# Patient Record
Sex: MALE | HISPANIC OR LATINO | Employment: FULL TIME | URBAN - METROPOLITAN AREA
[De-identification: names, ages, dates, MRNs, and addresses within clinical notes are randomized per-mention and may not be internally consistent; named-entity substitution may affect disease eponyms.]

---

## 2017-12-21 ENCOUNTER — APPOINTMENT (OUTPATIENT)
Dept: RADIOLOGY | Facility: IMAGING CENTER | Age: 37
End: 2017-12-21
Attending: PHYSICIAN ASSISTANT
Payer: COMMERCIAL

## 2017-12-21 ENCOUNTER — OCCUPATIONAL MEDICINE (OUTPATIENT)
Dept: URGENT CARE | Facility: CLINIC | Age: 37
End: 2017-12-21
Payer: COMMERCIAL

## 2017-12-21 VITALS
OXYGEN SATURATION: 99 % | HEIGHT: 71 IN | BODY MASS INDEX: 23.66 KG/M2 | HEART RATE: 69 BPM | SYSTOLIC BLOOD PRESSURE: 120 MMHG | TEMPERATURE: 97.9 F | DIASTOLIC BLOOD PRESSURE: 90 MMHG | WEIGHT: 169 LBS | RESPIRATION RATE: 16 BRPM

## 2017-12-21 DIAGNOSIS — M79.652 LEFT THIGH PAIN: ICD-10-CM

## 2017-12-21 DIAGNOSIS — M54.9 UPPER BACK PAIN ON RIGHT SIDE: ICD-10-CM

## 2017-12-21 DIAGNOSIS — Z02.1 PRE-EMPLOYMENT DRUG SCREENING: ICD-10-CM

## 2017-12-21 DIAGNOSIS — M94.0 COSTOCHONDRITIS, ACUTE: Primary | ICD-10-CM

## 2017-12-21 DIAGNOSIS — R07.81 RIB PAIN ON LEFT SIDE: ICD-10-CM

## 2017-12-21 DIAGNOSIS — S70.12XA CONTUSION OF LEFT ANTERIOR THIGH, INITIAL ENCOUNTER: ICD-10-CM

## 2017-12-21 LAB
AMP AMPHETAMINE: NORMAL
BREATH ALCOHOL COMMENT: NORMAL
COC COCAINE: NORMAL
INT CON NEG: NORMAL
INT CON POS: NORMAL
MET METHAMPHETAMINES: NORMAL
OPI OPIATES: NORMAL
PCP PHENCYCLIDINE: NORMAL
POC BREATHALIZER: 0 PERCENT (ref 0–0.01)
POC DRUG COMMENT 753798-OCCUPATIONAL HEALTH: NEGATIVE
THC: NORMAL

## 2017-12-21 PROCEDURE — 99203 OFFICE O/P NEW LOW 30 MIN: CPT | Mod: 29 | Performed by: PHYSICIAN ASSISTANT

## 2017-12-21 PROCEDURE — 71101 X-RAY EXAM UNILAT RIBS/CHEST: CPT | Mod: TC,LT,29 | Performed by: PHYSICIAN ASSISTANT

## 2017-12-21 PROCEDURE — 82075 ASSAY OF BREATH ETHANOL: CPT | Mod: 29 | Performed by: PHYSICIAN ASSISTANT

## 2017-12-21 PROCEDURE — 73552 X-RAY EXAM OF FEMUR 2/>: CPT | Mod: TC,LT,29 | Performed by: PHYSICIAN ASSISTANT

## 2017-12-21 PROCEDURE — 80305 DRUG TEST PRSMV DIR OPT OBS: CPT | Mod: 29 | Performed by: PHYSICIAN ASSISTANT

## 2017-12-21 NOTE — PROGRESS NOTES
"Subjective:      PT is a 37 y.o. male who presents with Back Pain (today at work, was bringing down the wall and missed a step and wall came down on his chest and upper leg, fell on his back, now having pain middle back and left chest and ribs, left leg)      DOI: 12/21/17  10:10am  PT states that today at work, he was bringing down a 10\"x10\" wall and missed a step and wall came down on his left chest and left upper leg, and he fell on his back, now having pain in the right upper middle back. Pt has not taken any Rx medications for this condition. Pt states the pain is a 7/10 in left chest region, aching in nature and worse \"right now\". Pt denies CP, SOB, NVD, paresthesias, headaches, dizziness, change in vision, hives, or other joint pain. The pt's medication list, problem list, and allergies have been evaluated and reviewed during today's visit.   Pt denies a second job.    HPI  PMH:  Negative per pt.      PSH:  Negative per pt.      Fam Hx:  the patient's family history is not pertinent to their current complaint      Soc HX:  Social History     Social History   • Marital status:      Spouse name: N/A   • Number of children: N/A   • Years of education: N/A     Occupational History   • Not on file.     Social History Main Topics   • Smoking status: Not on file   • Smokeless tobacco: Not on file   • Alcohol use Not on file   • Drug use: Unknown   • Sexual activity: Not on file     Other Topics Concern   • Not on file     Social History Narrative   • No narrative on file         Medications:  No current outpatient prescriptions on file.      Allergies:  Patient has no allergy information on record.    ROS  Constitutional: Negative for fever, chills and malaise/fatigue.   HENT: Negative for congestion and sore throat.    Eyes: Negative for blurred vision, double vision and photophobia.   Respiratory: Negative for cough and shortness of breath.    Cardiovascular: Negative for chest pain and palpitations. " "  Gastrointestinal: Negative for heartburn, nausea, vomiting, abdominal pain, diarrhea and constipation.   Genitourinary: Negative for dysuria and flank pain.   Musculoskeletal: POS for left thigh, left ribs and right upper back joint pain and myalgias.   Skin: Negative for itching and rash.   Neurological: Negative for dizziness, tingling and headaches.   Endo/Heme/Allergies: Does not bruise/bleed easily.   Psychiatric/Behavioral: Negative for depression. The patient is not nervous/anxious.           Objective:     /90   Pulse 69   Temp 36.6 °C (97.9 °F)   Resp 16   Ht 1.803 m (5' 11\")   Wt 76.7 kg (169 lb)   SpO2 99%   BMI 23.57 kg/m²      Physical Exam    Left chest wall: Upon inspection, + ecchymoses, +edema, no erythema. +TTP left anterolateral ribs  Left upper thigh: Upon inspection, no ecchymoses, mild edema, no erythema. +TTP, +AROM, +SILT, STR 5/5, DP 2+ B/L   Right upper back: Upon inspection, no ecchymoses, no edema, no erythema. +TTP, +AROM        Constitutional: PT is oriented to person, place, and time. PT appears well-developed and well-nourished. No distress.   HENT:   Head: Normocephalic and atraumatic.   Mouth/Throat: Oropharynx is clear and moist. No oropharyngeal exudate.   Eyes: Conjunctivae normal and EOM are normal. Pupils are equal, round, and reactive to light.   Neck: Normal range of motion. Neck supple. No thyromegaly present.   Cardiovascular: Normal rate, regular rhythm, normal heart sounds and intact distal pulses.  Exam reveals no gallop and no friction rub.    No murmur heard.  Pulmonary/Chest: Effort normal and breath sounds normal. No respiratory distress. PT has no wheezes. PT has no rales. Pt exhibits no tenderness.   Abdominal: Soft. Bowel sounds are normal. PT exhibits no distension and no mass. There is no tenderness. There is no rebound and no guarding.   Neurological: PT is alert and oriented to person, place, and time. PT has normal reflexes. No cranial nerve " deficit.   Skin: Skin is warm and dry. No rash noted. PT is not diaphoretic. No erythema.       Psychiatric: PT has a normal mood and affect. PT behavior is normal. Judgment and thought content normal.     RADS:  Narrative     12/21/2017 12:05 PM    HISTORY/REASON FOR EXAM:  Pain/Deformity Following Trauma.  .    TECHNIQUE/EXAM DESCRIPTION AND NUMBER OF VIEWS:  2 views of the LEFT femur.    COMPARISON: None    FINDINGS:  Femoral head is seated within the acetabulum. Joint space is maintained. No fracture or dislocation is seen. There is linear calcification in the soft tissues of the medial left thigh. There is a trace suprapatellar joint effusion.   Impression     No fracture or dislocation is seen.    Linear calcification in the soft tissues of the medial thigh is likely dystrophic/heterotopic.    Trace suprapatellar joint effusion.      Reading Provider Reading Date   Courtney Burris M.D. Dec 21, 2017      Signing Provider Signing Date Signing Time   Courtney Burris M.D. Dec 21, 2017  1:04 PM   Narrative     12/21/2017 12:05 PM    HISTORY/REASON FOR EXAM:  Pain Following Trauma.  Left Mid rib/ chest pain after part of a wall fell on chest    TECHNIQUE/EXAM DESCRIPTION AND NUMBER OF VIEWS:  5 images of the left ribs and chest.    COMPARISON: NONE    FINDINGS:  No displaced fractures or acute bony changes are noted.  No airspace opacity or consolidation.  There is no evidence of a hemo or pneumothorax.  Normal cardiopericardial silhouette.   Impression     No displaced rib fracture.      Reading Provider Reading Date   Yefri Cespedes M.D. Dec 21, 2017      Signing Provider Signing Date Signing Time   Yefri Cespedes M.D. Dec 21, 2017  1:06 PM            Assessment/Plan:     1. Costochondritis    2. Rib pain on left side    - [id number] (WITH 1-VIEW CXR) LEFT; Future    2. Left thigh contusion    - DX-FEMUR-2+ LEFT; Future    3. Upper back pain on right side    - [id number] (WITH 1-VIEW CXR)  LEFT; Future    RICE therapy discussed  Gentle ROM exercises discussed  WBAT left LE  Ice/heat therapy discussed  OTC ibuprofen for pain control  Rest, fluids encouraged.  AVS with medical info given.  Pt was in full understanding and agreement with the plan.  Follow-up 3 days for next WC visit

## 2017-12-21 NOTE — LETTER
"EMPLOYEE’S CLAIM FOR COMPENSATION/ REPORT OF INITIAL TREATMENT  FORM C-4    EMPLOYEE’S CLAIM - PROVIDE ALL INFORMATION REQUESTED   First Name  Bladimir Last Name  Cheri Birthdate                    1980                Sex  male Claim Number   Home Address  [address] Age  37 y.o. Height  1.803 m (5' 11\") Weight  76.7 kg (169 lb) N     [facility] Zip  [address] Telephone  [phone] (home)    Mailing Address  [address] [facility] Zip  [address] Primary Language Spoken  Sami    Insurer   Third Party   [employer]  [place]   Employee's Occupation (Job Title) When Injury or Occupational Disease Occurred      Employer's Name  [employer]  Telephone  [phone]    Employer Address  [address]  [facility]  Zip  [address]   Date of Injury  12/21/2017               Hour of Injury  10:10 AM Date Employer Notified  12/21/2017 Last Day of Work after Injury or Occupational Disease  12/21/2017 Supervisor to Whom Injury Reported  Himanshu Orosco   Address or Location of Accident (if applicable)  [[address]]   What were you doing at the time of accident? (if applicable)  Putting a wall down to 9'    How did this injury or occupational disease occur? (Be specific an answer in detail. Use additional sheet if necessary)  Laying the 10'x10' wall down to cut down tow 0' and slipped down because I stepped on a rock.   If you believe that you have an occupational disease, when did you first have knowledge of the disability and it relationship to your employment?   Witnesses to the Accident  John      Nature of Injury or Occupational Disease  Sprain  Part(s) of Body Injured or Affected  Upper Leg (L), ,     I certify that the above is true and correct to the best of my knowledge and that I have provided this information in order to obtain the benefits of [place]’s " [place]).  I hereby authorize any physician, chiropractor, surgeon, practitioner, or other person, any hospital, including [facility] or [facility], any medical service organization, any insurance company, or other institution or organization to release to each other, any medical or other information, including benefits paid or payable, pertinent to this injury or disease, except information relative to diagnosis, treatment and/or counseling for AIDS, psychological conditions, alcohol or controlled substances, for which I must give specific authorization.  A Photostat of this authorization shall be as valid as the original.     Date   Place   Employee’s Signature   THIS REPORT MUST BE COMPLETED AND MAILED WITHIN 3 WORKING DAYS OF TREATMENT   Place  [facility]  Name of Facility  [facility]   Date  12/21/2017 Diagnosis  (M94.0) Costochondritis, acute  (primary encounter diagnosis)  (R07.81) Rib pain on left side  (S70.12XA) Contusion of left anterior thigh, initial encounter  (M54.9) Upper back pain on right side Is there evidence the injured employee was under the influence of alcohol and/or another controlled substance at the time of accident?   Hour  11:31 AM Description of Injury or Disease  The primary encounter diagnosis was Costochondritis, acute. Diagnoses of Rib pain on left side, Contusion of left anterior thigh, initial encounter, and Upper back pain on right side were also pertinent to this visit. No   Treatment  Rest, ice therapy, OTC ibuprofen, Gentle ROM exercises  Have you advised the patient to remain off work five days or more? No   X-Ray Findings  Negative   If Yes   From Date  To Date      From information given by the employee, together with medical evidence, can you directly connect this injury or occupational disease as job incurred?  Yes If No Full Duty  No Modified  "Duty  Yes   Is additional medical care by a physician indicated?  Yes If Modified Duty, Specify any Limitations / Restrictions  SEE RESTRICTIONS   Do you know of any previous injury or disease contributing to this condition or occupational disease?                            No   Date  12/21/2017 Print Doctor’s Name Yon Carreon P.A.-C. I certify the employer’s copy of  this form was mailed on:   Address  [address] Insurer’s Use Only     [facility] Zip  [address]    Provider’s Tax ID Number  [id number] Telephone  Dept: [phone]        e-YON Elizabeth P.A.-C.   e-Signature: Dr. Loyd Sales, Medical Director Degree  MELLISA        ORIGINAL-TREATING PHYSICIAN OR CHIROPRACTOR    PAGE 2-INSURER/TPA    PAGE 3-EMPLOYER    PAGE 4-EMPLOYEE             Form C-4 (rev10/07)              BRIEF DESCRIPTION OF RIGHTS AND BENEFITS  (Pursuant to [place]RS 616C.050)    Notice of Injury or Occupational Disease (Incident Report Form C-1): If an injury or occupational disease (OD) arises out of and in the  course of employment, you must provide written notice to your employer as soon as practicable, but no later than 7 days after the accident or  OD. Your employer shall maintain a sufficient supply of the required forms.    Claim for Compensation (Form C-4): If medical treatment is sought, the form C-4 is available at the place of initial treatment. A completed  \"Claim for Compensation\" (Form C-4) must be filed within 90 days after an accident or OD. The treating physician or chiropractor must,  within 3 working days after treatment, complete and mail to the employer, the employer's insurer and third-party , the Claim for  Compensation.    Medical Treatment: If you require medical treatment for your on-the-job injury or OD, you may be required to select a physician or  chiropractor from a list provided by your workers’ compensation insurer, if it has contracted with an " Organization for Managed Care (MCO) or  Preferred Provider Organization (PPO) or providers of health care. If your employer has not entered into a contract with an MCO or PPO, you  may select a physician or chiropractor from the Panel of Physicians and Chiropractors. Any medical costs related to your industrial injury or  OD will be paid by your insurer.    Temporary Total Disability (TTD): If your doctor has certified that you are unable to work for a period of at least 5 consecutive days, or 5  cumulative days in a 20-day period, or places restrictions on you that your employer does not accommodate, you may be entitled to TTD  compensation.    Temporary Partial Disability (TPD): If the wage you receive upon reemployment is less than the compensation for TTD to which you are  entitled, the insurer may be required to pay you TPD compensation to make up the difference. TPD can only be paid for a maximum of 24  months.    Permanent Partial Disability (PPD): When your medical condition is stable and there is an indication of a PPD as a result of your injury or  OD, within 30 days, your insurer must arrange for an evaluation by a rating physician or chiropractor to determine the degree of your PPD. The  amount of your PPD award depends on the date of injury, the results of the PPD evaluation and your age and wage.    Permanent Total Disability (PTD): If you are medically certified by a treating physician or chiropractor as permanently and totally disabled  and have been granted a PTD status by your insurer, you are entitled to receive monthly benefits not to exceed 66 2/3% of your average  monthly wage. The amount of your PTD payments is subject to reduction if you previously received a PPD award.    Vocational Rehabilitation Services: You may be eligible for vocational rehabilitation services if you are unable to return to the job due to a  permanent physical impairment or permanent restrictions as a result of your  injury or occupational disease.    Transportation and Per Rosie Reimbursement: You may be eligible for travel expenses and per rosie associated with medical treatment.    Reopening: You may be able to reopen your claim if your condition worsens after claim closure.    Appeal Process: If you disagree with a written determination issued by the insurer or the insurer does not respond to your request, you may  appeal to the Department of Administration, , by following the instructions contained in your determination letter. You must  appeal the determination within 70 days from the date of the determination letter at [address], [address], [place], [place]  [address], or [address], [address], [place], [place] [address]. If you disagree with the  decision, you may appeal to the  Department of Administration, . You must file your appeal within 30 days from the date of the  decision  letter at [address], [address], [place], [place] [address], or [address] [facility] [address], [place] [address]. If you  disagree with a decision of an , you may file a petition for judicial review with the District Court. You must do so within 30  days of the Appeal Officer’s decision. You may be represented by an  at your own expense or you may contact the [facility] for possible  representation.    [place]  for Injured Workers (NAIW): If you disagree with a  decision, you may request that NAIW represent you  without charge at an  Hearing. For information regarding denial of benefits, you may contact the [facility] at: [address], [address], [place] [address], [phone], or [address] [facility] [address], [place] [address], [phone]    To File a Complaint with the Division: If you wish to file a complaint with the  of the Division of Industrial Relations  (DIR),  please contact the Workers’ Compensation Section, [address], [address], [place], [place] [address], telephone [phone], or  [address], [address], [place], [place] [address], telephone [phone].    For assistance with Workers’ Compensation Issues: you may contact the Office of the [facility] Consumer Health Assistance, [address] [place] [address], [address], [place], [place] [address], Toll Free 1-458.398.7802, Web site: http://govcha.[facility].nv., E-mail  [email or web address].[email or web address].                                                                                                                                                                                                                                   __________________________________________________________________                                                                   _________________                Employee Name / Signature                                                                                                                                                       Date                                                                                                                                                                                                     D-2 (rev. 10/07)

## 2017-12-21 NOTE — PATIENT INSTRUCTIONS
Costocondritis  (Costochondritis)  La costocondritis a veces llamada síndrome de [place] es la hinchazón e irritación (inflamación) del tejido (cartílago) que une las costillas con el (esternón). [place] causa dolor en el pecho y la nathan de las costillas. La costocondritis generalmente desaparece por sí misma con el tiempo. Podrá tardar hasta 6 semanas en curarse o más, especialmente si no puede restringir tarun actividades.  CAUSAS   Algunos casos de costocondritis no tienen causa conocida. Las causas posibles son:  · Lesiones (traumatismos).  · Ejercicios o actividades relacionadas con levantar pesos.  · Tos intensa.  SIGNOS Y SÍNTOMAS  · Dolor y sensibilidad en el área de las costillas.  · Dolor que empeora al toser o respirar profundamente.  · Dolor que empeora con movimientos específicos.  DIAGNÓSTICO   El médico le preguntará acerca de tarun síntomas y le hará un examen físico. Podrá indicarle radiografías para descartar otros problemas.  TRATAMIENTO   La costocondritis generalmente desaparece por sí misma con el tiempo. El médico podrá recetar algunos medicamentos para calmar el dolor.  INSTRUCCIONES PARA EL CUIDADO EN EL HOGAR   · Evite la actividad física extenuante. Trate de no esforzar las costillas zohra las actividades habituales. Aquí se incluyen las actividades en las que usa los músculos del pecho, abdominales y los músculos laterales, especialmente si debe levantar peso.  · Aplique hielo en la nathan afectada zohra los primeros 2 aileen después del inicio del dolor.  ¨ Ponga el hielo en ti bolsa plástica.  ¨ Colóqu[place] ti toalla entre la piel y la bolsa de hielo.  ¨ Deje el hielo zohra 20 minutos, y aplíquelo 2-3 veces por día.  · [place] sólo medicamentos de venta oseas o recetados, según las indicaciones del médico.  SOLICITE ATENCIÓN MÉDICA SI:  · Tiene hinchazón o irritación en las articulaciones de las costillas. Estos son signos de infección.  · El dolor no desaparece aunque rodolfo reposo o tome  medicamentos para el dolor.  SOLICITE ATENCIÓN MÉDICA DE INMEDIATO SI:   · El dolor aumenta o siente muchas molestias.  · Le falta el aire o tiene dificultad para respirar.  · Tose y escupe brayan.  · Siente falta de aire o dolor en el pecho, sudoración o vómitos.  · Tiene fiebre o síntomas persistentes zohra más de 2 - 3 aileen.  · Tiene fiebre y los síntomas empeoran repentinamente.  ASEGÚRESE DE QUE:   · Comprende estas instrucciones.  · Controlará turner afección.  · Recibirá ayuda de inmediato si no mejora o si empeora.     Esta información no tiene tamiko fin reemplazar el consejo del médico. Asegúrese de hacerle al médico cualquier pregunta que tenga.     Document Released: 09/27/2006 Document Revised: 10/08/2014  Elsevier Interactive Patient Education ©2016 Elsevier Inc.

## 2017-12-21 NOTE — LETTER
"   [facility]  [address] - [place] [address]  Phone:  [phone] - Fax:  [phone]   [facility] Progress Report and Disability Certification  Date of Service: 12/21/2017   No Show:  No  Date / Time of Next Visit: 12/24/2017   Claim Information   Patient Name: Bladimir Alonzo  Claim Number:     Employer: [employer]  Date of Injury: 12/21/2017     Insurer / TPA: [employer] [place]  ID / SSN:     Occupation:   Diagnosis: The primary encounter diagnosis was Costochondritis, acute. Diagnoses of Rib pain on left side, Contusion of left anterior thigh, initial encounter, and Upper back pain on right side were also pertinent to this visit.    Medical Information   Related to Industrial Injury? Yes    Subjective Complaints:  DOI: 12/21/17  10:10am  PT states that today at work, he was bringing down a 10\"x10\" wall and missed a step and wall came down on his left chest and left upper leg, and he fell on his back, now having pain in the right upper middle back. Pt has not taken any Rx medications for this condition. Pt states the pain is a 7/10 in left chest region, aching in nature and worse \"right now\". Pt denies CP, SOB, NVD, paresthesias, headaches, dizziness, change in vision, hives, or other joint pain. The pt's medication list, problem list, and allergies have been evaluated and reviewed during today's visit.     Objective Findings: Left chest wall: Upon inspection, + ecchymoses, +edema, no erythema. +TTP left anterolateral ribs  Left upper thigh: Upon inspection, no ecchymoses, mild edema, no erythema. +TTP, +AROM, +SILT, STR 5/5, DP 2+ B/L   Right upper back: Upon inspection, no ecchymoses, no edema, no erythema. +TTP, +AROM          Pre-Existing Condition(s):     Assessment:   Initial Visit    Status: Additional Care Required  Permanent Disability:No    Plan: Medication  Comments:OTC ibuprofen for pain    Diagnostics: " X-ray  Comments:NEG    Comments:       Disability Information   Status: Released to Restricted Duty    From:  2017  Through: 2017 Restrictions are: Temporary   Physical Restrictions   Sitting:    Standing:  < or = to 2 hrs/day Stooping:    Bending:      Squattin hrs/day Walking:  < or = to 2 hrs/day Climbin hrs/day Pushing:      Pulling:    Other:    Reaching Above Shoulder (L):   Reaching Above Shoulder (R):       Reaching Below Shoulder (L):    Reaching Below Shoulder (R):      Not to exceed Weight Limits   Carrying(hrs):   Weight Limit(lb): < or = to 10 pounds Lifting(hrs):   Weight  Limit(lb): < or = to 10 pounds   Comments:      Repetitive Actions   Hands: i.e. Fine Manipulations from Grasping:     Feet: i.e. Operating Foot Controls:     Driving / Operate Machinery:     Physician Name: Yon Carreon P.A.-C. Physician Signature: YON Beauchamp P.A.-C. e-Signature: Dr. Loyd Sales, Medical Director   Clinic Name / Location: [address] [place] [address] Clinic Phone Number: Dept: [phone]   Appointment Time: 11:00 Am Visit Start Time: 11:31 AM   Check-In Time:  11:10 Am Visit Discharge Time:    Original-Treating Physician or Chiropractor    Page 2-Insurer/TPA    Page 3-Employer    Page 4-Employee

## 2018-02-19 ENCOUNTER — OCCUPATIONAL MEDICINE (OUTPATIENT)
Dept: URGENT CARE | Facility: CLINIC | Age: 38
End: 2018-02-19
Payer: COMMERCIAL

## 2018-02-19 VITALS
RESPIRATION RATE: 20 BRPM | HEART RATE: 68 BPM | BODY MASS INDEX: 23.8 KG/M2 | OXYGEN SATURATION: 96 % | DIASTOLIC BLOOD PRESSURE: 70 MMHG | WEIGHT: 170 LBS | HEIGHT: 71 IN | SYSTOLIC BLOOD PRESSURE: 110 MMHG | TEMPERATURE: 98.3 F

## 2018-02-19 DIAGNOSIS — S20.212D CHEST WALL CONTUSION, LEFT, SUBSEQUENT ENCOUNTER: ICD-10-CM

## 2018-02-19 PROCEDURE — 99214 OFFICE O/P EST MOD 30 MIN: CPT | Performed by: INTERNAL MEDICINE

## 2018-02-19 NOTE — LETTER
[facility]  [address] - [place] [address]  Phone:  [phone] - Fax:  [phone]   [facility] Progress Report and Disability Certification  Date of Service: 2/19/2018   No Show:  No  Date / Time of Next Visit:     Claim Information   Patient Name: Bladimir Alonzo  Claim Number:     Employer: [employer]  Date of Injury: 12/21/2017     Insurer / TPA: [employer]  ID / SSN:     Occupation:   Diagnosis: There were no encounter diagnoses.    Medical Information   Related to Industrial Injury? Yes    Subjective Complaints:  Patient returns for recheck today. States that he has no pain or any continued problems.   Objective Findings: Patient appears to be without any significant issue at this point physical exam is within normal limits   Pre-Existing Condition(s):     Assessment:   Condition Improved    Status: Discharged /  MMI  Permanent Disability:No    Plan:      Diagnostics:      Comments:       Disability Information   Status: Released to Full Duty    From:     Through:   Restrictions are:     Physical Restrictions   Sitting:    Standing:    Stooping:    Bending:      Squatting:    Walking:    Climbing:    Pushing:      Pulling:    Other:    Reaching Above Shoulder (L):   Reaching Above Shoulder (R):       Reaching Below Shoulder (L):    Reaching Below Shoulder (R):      Not to exceed Weight Limits   Carrying(hrs):   Weight Limit(lb):   Lifting(hrs):   Weight  Limit(lb):     Comments:      Repetitive Actions   Hands: i.e. Fine Manipulations from Grasping:     Feet: i.e. Operating Foot Controls:     Driving / Operate Machinery:     Physician Name: Loyd Sales M.D. Physician Signature: LOYD Klein M.D. e-Signature: Dr. Loyd Sales, Medical Director   Clinic Name / Location: [facility]  [address]  [place] [address] Clinic Phone Number: Dept: [phone]    Appointment Time: 12:45 Pm Visit Start Time: 12:41 PM   Check-In Time:  12:36 Pm Visit Discharge Time:  1:14 PM   Original-Treating Physician or Chiropractor    Page 2-Insurer/TPA    Page 3-Employer    Page 4-Employee

## 2018-02-19 NOTE — PROGRESS NOTES
"Subjective:      Bladimir Alonzo is a 37 y.o. male who presents with Chest Injury      Patient returns for recheck today. States that he has no pain or any continued problems.     HPI    ROS  All review of systems negative     Objective:     /70   Pulse 68   Temp 36.8 °C (98.3 °F)   Resp 20   Ht 1.803 m (5' 11\")   Wt 77.1 kg (170 lb)   SpO2 96%   BMI 23.71 kg/m²      Physical Exam   Constitutional: He is oriented to person, place, and time. He appears well-developed and well-nourished. He is active. No distress.   HENT:   Head: Normocephalic and atraumatic.   Right Ear: External ear normal.   Left Ear: External ear normal.   Mouth/Throat: Oropharynx is clear and moist. No oropharyngeal exudate.   Eyes: Conjunctivae are normal. Pupils are equal, round, and reactive to light. Right eye exhibits no discharge.   Neck: Normal range of motion. Neck supple. No JVD present. Carotid bruit is not present. No thyroid mass and no thyromegaly present.   Cardiovascular: Normal rate, regular rhythm, S1 normal, S2 normal and normal heart sounds.  Exam reveals no friction rub.    No murmur heard.  Pulmonary/Chest: Effort normal and breath sounds normal. No respiratory distress. He has no wheezes. He has no rales.   Abdominal: There is no hepatosplenomegaly. There is no guarding.   Musculoskeletal: Normal range of motion. He exhibits no edema.        Cervical back: Normal.   Lymphadenopathy:        Head (right side): No submental, no submandibular and no occipital adenopathy present.        Head (left side): No submental, no submandibular and no occipital adenopathy present.     He has no cervical adenopathy.   Neurological: He is alert and oriented to person, place, and time. He has normal strength. No cranial nerve deficit.   Skin: Skin is warm and dry. No rash noted. No erythema.   Psychiatric: He has a normal mood and affect. His behavior is normal. Thought content normal.       Patient appears to be without any " significant issue at this point physical exam is within normal limits       Assessment/Plan:     Normal physical exam. Patient released to full duty